# Patient Record
Sex: MALE | Race: OTHER | URBAN - METROPOLITAN AREA
[De-identification: names, ages, dates, MRNs, and addresses within clinical notes are randomized per-mention and may not be internally consistent; named-entity substitution may affect disease eponyms.]

---

## 2017-07-27 ENCOUNTER — OUTPATIENT (OUTPATIENT)
Dept: OUTPATIENT SERVICES | Facility: HOSPITAL | Age: 62
LOS: 1 days | End: 2017-07-27

## 2021-10-26 ENCOUNTER — OUTPATIENT (OUTPATIENT)
Dept: OUTPATIENT SERVICES | Facility: HOSPITAL | Age: 66
LOS: 1 days | End: 2021-10-26

## 2021-10-28 PROBLEM — Z00.00 ENCOUNTER FOR PREVENTIVE HEALTH EXAMINATION: Status: ACTIVE | Noted: 2021-10-28

## 2021-11-03 ENCOUNTER — NON-APPOINTMENT (OUTPATIENT)
Age: 66
End: 2021-11-03

## 2021-11-03 ENCOUNTER — APPOINTMENT (OUTPATIENT)
Dept: CARDIOLOGY | Facility: CLINIC | Age: 66
End: 2021-11-03
Payer: MEDICARE

## 2021-11-03 VITALS
BODY MASS INDEX: 34.59 KG/M2 | WEIGHT: 223 LBS | HEART RATE: 62 BPM | HEIGHT: 67.5 IN | SYSTOLIC BLOOD PRESSURE: 108 MMHG | TEMPERATURE: 96.4 F | DIASTOLIC BLOOD PRESSURE: 68 MMHG | OXYGEN SATURATION: 97 %

## 2021-11-03 DIAGNOSIS — R07.89 OTHER CHEST PAIN: ICD-10-CM

## 2021-11-03 DIAGNOSIS — Z78.9 OTHER SPECIFIED HEALTH STATUS: ICD-10-CM

## 2021-11-03 DIAGNOSIS — F17.290 NICOTINE DEPENDENCE, OTHER TOBACCO PRODUCT, UNCOMPLICATED: ICD-10-CM

## 2021-11-03 DIAGNOSIS — R94.31 ABNORMAL ELECTROCARDIOGRAM [ECG] [EKG]: ICD-10-CM

## 2021-11-03 DIAGNOSIS — Z82.49 FAMILY HISTORY OF ISCHEMIC HEART DISEASE AND OTHER DISEASES OF THE CIRCULATORY SYSTEM: ICD-10-CM

## 2021-11-03 DIAGNOSIS — F17.200 NICOTINE DEPENDENCE, UNSPECIFIED, UNCOMPLICATED: ICD-10-CM

## 2021-11-03 DIAGNOSIS — E66.3 OVERWEIGHT: ICD-10-CM

## 2021-11-03 DIAGNOSIS — Z86.69 PERSONAL HISTORY OF OTHER DISEASES OF THE NERVOUS SYSTEM AND SENSE ORGANS: ICD-10-CM

## 2021-11-03 DIAGNOSIS — R07.9 CHEST PAIN, UNSPECIFIED: ICD-10-CM

## 2021-11-03 DIAGNOSIS — Z86.39 PERSONAL HISTORY OF OTHER ENDOCRINE, NUTRITIONAL AND METABOLIC DISEASE: ICD-10-CM

## 2021-11-03 DIAGNOSIS — Z80.9 FAMILY HISTORY OF MALIGNANT NEOPLASM, UNSPECIFIED: ICD-10-CM

## 2021-11-03 PROCEDURE — 99204 OFFICE O/P NEW MOD 45 MIN: CPT

## 2021-11-03 NOTE — REASON FOR VISIT
[FreeTextEntry1] : The patient is referred for cardiology consultation because of chest pain.  The patient states approximately 3 to 5 years ago he developed chest discomfort syndrome.  Occurs approximately once every 9 months.  He believes it may be from stress.  He describes a constriction-like feeling.  There are no other associated symptoms.  Patient enjoys excellent exercise capacity without exertional symptoms.  He walks several miles at a time without exertional symptoms.  The symptoms last for a very brief period of time and then resolve.

## 2021-11-03 NOTE — ASSESSMENT
[FreeTextEntry1] : Chest discomfort: ECG reveals no evidence of myocardial infarction.  There is normal sinus rhythm right bundle branch block.  There was some benefit of stress testing and echocardiography have been reviewed.  The patient declines any further investigation in this regard.

## 2021-11-05 PROBLEM — Z82.49 FAMILY HISTORY OF RHEUMATIC FEVER: Status: ACTIVE | Noted: 2021-11-03

## 2021-11-05 PROBLEM — E66.3 OVERWEIGHT: Status: ACTIVE | Noted: 2021-11-03

## 2021-11-05 PROBLEM — R07.89 CHEST TIGHTNESS: Status: ACTIVE | Noted: 2021-11-03

## 2021-11-05 PROBLEM — F17.290 CIGAR SMOKER: Status: RESOLVED | Noted: 2021-11-03 | Resolved: 2021-11-05

## 2021-11-05 PROBLEM — Z86.69 HISTORY OF HEARING LOSS: Status: RESOLVED | Noted: 2021-11-03 | Resolved: 2021-11-05

## 2021-11-05 PROBLEM — Z86.39 HISTORY OF OBESITY: Status: RESOLVED | Noted: 2021-11-03 | Resolved: 2021-11-05

## 2021-11-05 PROBLEM — Z82.49 FAMILY HISTORY OF CORONARY ARTERY DISEASE: Status: ACTIVE | Noted: 2021-11-03

## 2021-11-05 PROBLEM — R94.31 ABNORMAL EKG: Status: ACTIVE | Noted: 2021-11-03

## 2021-11-05 PROBLEM — Z78.9 CAFFEINE USE: Status: ACTIVE | Noted: 2021-11-03

## 2021-11-05 PROBLEM — Z80.9 FAMILY HISTORY OF MALIGNANT NEOPLASM: Status: ACTIVE | Noted: 2021-11-03

## 2021-11-05 PROBLEM — F17.200 CURRENT SMOKER: Status: ACTIVE | Noted: 2021-11-03

## 2023-06-09 ENCOUNTER — APPOINTMENT (OUTPATIENT)
Dept: ULTRASOUND IMAGING | Facility: CLINIC | Age: 68
End: 2023-06-09
Payer: MEDICARE

## 2023-06-09 PROCEDURE — 93925 LOWER EXTREMITY STUDY: CPT

## 2024-06-05 ENCOUNTER — LABORATORY RESULT (OUTPATIENT)
Age: 69
End: 2024-06-05